# Patient Record
Sex: FEMALE | URBAN - METROPOLITAN AREA
[De-identification: names, ages, dates, MRNs, and addresses within clinical notes are randomized per-mention and may not be internally consistent; named-entity substitution may affect disease eponyms.]

---

## 2020-03-10 ENCOUNTER — OFFICE VISIT (OUTPATIENT)
Dept: URBAN - METROPOLITAN AREA CLINIC 81 | Facility: CLINIC | Age: 45
End: 2020-03-10
Payer: COMMERCIAL

## 2020-03-10 DIAGNOSIS — H52.13 MYOPIA, BILATERAL: Primary | ICD-10-CM

## 2020-03-10 PROCEDURE — 92004 COMPRE OPH EXAM NEW PT 1/>: CPT | Performed by: OPTOMETRIST

## 2020-03-10 ASSESSMENT — INTRAOCULAR PRESSURE
OS: 16
OD: 14

## 2020-03-10 ASSESSMENT — KERATOMETRY
OS: 45.25
OD: 44.88

## 2020-03-10 ASSESSMENT — VISUAL ACUITY
OS: 20/25
OD: 20/20

## 2020-03-10 NOTE — IMPRESSION/PLAN
Impression: Myopia, bilateral: H52.13.

 - New CL Rx given - Trial toric OS - Plan: New spec Rx given - Discussed changes and possible adaptation period. 

RTC 1 year/PRN

## 2020-06-03 ENCOUNTER — TESTING ONLY (OUTPATIENT)
Dept: URBAN - METROPOLITAN AREA CLINIC 81 | Facility: CLINIC | Age: 45
End: 2020-06-03
Payer: COMMERCIAL

## 2020-06-03 PROCEDURE — 92310 CONTACT LENS FITTING OU: CPT | Performed by: OPTOMETRIST

## 2020-06-03 NOTE — IMPRESSION/PLAN
Impression: Myopia, bilateral: H52.13.

 - CTL Rx updated - Pt more comfortable w/ old Rx even though BCVA less Plan: RTC PRN

## 2021-10-06 ENCOUNTER — OFFICE VISIT (OUTPATIENT)
Dept: URBAN - METROPOLITAN AREA CLINIC 76 | Facility: CLINIC | Age: 46
End: 2021-10-06
Payer: COMMERCIAL

## 2021-10-06 PROCEDURE — 92310 CONTACT LENS FITTING OU: CPT | Performed by: OPTOMETRIST

## 2021-10-06 PROCEDURE — 92012 INTRM OPH EXAM EST PATIENT: CPT | Performed by: OPTOMETRIST

## 2021-10-06 ASSESSMENT — VISUAL ACUITY
OS: 20/25
OD: 20/25

## 2021-10-06 ASSESSMENT — INTRAOCULAR PRESSURE
OS: 17
OD: 16

## 2021-10-06 ASSESSMENT — KERATOMETRY
OD: 45.13
OS: 45.50

## 2021-10-06 NOTE — IMPRESSION/PLAN
Impression: Myopia, bilateral: H52.13. High myopia OU Plan: A glasses prescription has been discussed and generated. A contact lens prescription has also been discussed and generated. Patient to call with any concerns. The patient declined a dilated exam today. The benefits of a dilation were explained. S&S of RD explained.

## 2022-05-02 ENCOUNTER — OFFICE VISIT (OUTPATIENT)
Dept: URBAN - METROPOLITAN AREA CLINIC 76 | Facility: CLINIC | Age: 47
End: 2022-05-02
Payer: COMMERCIAL

## 2022-05-02 DIAGNOSIS — H02.889 MGD OF EYE: ICD-10-CM

## 2022-05-02 DIAGNOSIS — H10.45 OTHER CHRONIC ALLERGIC CONJUNCTIVITIS: ICD-10-CM

## 2022-05-02 DIAGNOSIS — H52.13 MYOPIA, BILATERAL: Primary | ICD-10-CM

## 2022-05-02 PROCEDURE — 92014 COMPRE OPH EXAM EST PT 1/>: CPT | Performed by: OPTOMETRIST

## 2022-05-02 PROCEDURE — 92310 CONTACT LENS FITTING OU: CPT | Performed by: OPTOMETRIST

## 2022-05-02 ASSESSMENT — KERATOMETRY
OD: 44.88
OS: 45.50

## 2022-05-02 ASSESSMENT — INTRAOCULAR PRESSURE
OS: 16
OD: 14

## 2022-05-02 ASSESSMENT — VISUAL ACUITY
OS: 20/25
OD: 20/25

## 2022-05-02 NOTE — IMPRESSION/PLAN
Impression: Myopia, bilateral: H52.13. Bilateral. Plan: Discussed diagnosis with patient. Dispensed new MRx today. Order ctls trials, then 1 week f/u. Pt to call with any concerns.

## 2022-06-09 ENCOUNTER — TESTING ONLY (OUTPATIENT)
Dept: URBAN - METROPOLITAN AREA CLINIC 76 | Facility: CLINIC | Age: 47
End: 2022-06-09

## 2022-06-09 DIAGNOSIS — H52.13 MYOPIA, BILATERAL: Primary | ICD-10-CM

## 2022-06-09 PROCEDURE — 92310 CONTACT LENS FITTING OU: CPT | Performed by: OPTOMETRIST

## 2022-06-09 NOTE — IMPRESSION/PLAN
Impression: Myopia, bilateral: H52.13. Bilateral. Disliked toric ctls. Plan: Rediscussed diagnosis with patient. Order ctls trials. Okay to finalize if happy with the vision and comfort OU. Pt is to call with any concerns.